# Patient Record
Sex: FEMALE | Race: WHITE | NOT HISPANIC OR LATINO
[De-identification: names, ages, dates, MRNs, and addresses within clinical notes are randomized per-mention and may not be internally consistent; named-entity substitution may affect disease eponyms.]

---

## 2023-12-01 PROBLEM — Z00.00 ENCOUNTER FOR PREVENTIVE HEALTH EXAMINATION: Status: ACTIVE | Noted: 2023-12-01

## 2023-12-14 ENCOUNTER — APPOINTMENT (OUTPATIENT)
Dept: SURGERY | Facility: CLINIC | Age: 73
End: 2023-12-14
Payer: MEDICARE

## 2023-12-14 VITALS
WEIGHT: 199 LBS | HEIGHT: 64 IN | DIASTOLIC BLOOD PRESSURE: 80 MMHG | BODY MASS INDEX: 33.97 KG/M2 | SYSTOLIC BLOOD PRESSURE: 130 MMHG

## 2023-12-14 DIAGNOSIS — Z80.41 FAMILY HISTORY OF MALIGNANT NEOPLASM OF OVARY: ICD-10-CM

## 2023-12-14 PROCEDURE — 99205 OFFICE O/P NEW HI 60 MIN: CPT

## 2023-12-14 NOTE — HISTORY OF PRESENT ILLNESS
[FreeTextEntry1] : Oncology History:  1. Left breast 2:00 10cm FN cT1cN0 IDC, grade 2, ER/MS +, Her 2 negative       -1.3cm on MMG, 0.9cm on US 0.9, 1.9 x 1.5 x 1cm on MRI   Care Team:  PCP-[facility]   HPI: Patient is a 73 year old female presenting for initial consultation on 23 for newly diagnosed left breast cancer. Patient underwent screening mammogram on 23 that demonstrated an irregular density 1. 3 cm in the left breast. Patient called back for ultrasound breast left on 23 that demonstrated an irregular mass in the left 2:00 10 cm FN measuring 0.9 cm in greatest diameter. Ultrasound guided biopsy preformed on 23 demonstrated invasive ductal carcinoma, moderately differentiated and focal ductal carcinoma in situ, solid type, intermediate nuclear grade, 7mm, ER/MS +, Her 2 negative.   BREAST HISTORY: No family history of breast cancer. Sister diagnosed with ovarian cancer in . Prior benign right breast biopsy in . Up to date on her mammograms. Denies breast complaints.   NKDA, no latex allergy No AC or aspirin     Imagin23: Zaid: Screening Mammogram Bilateral: Density B, Impression - In the left breast 3:00 position 11 cm FN there is an irregular indistinct density measuring 1. 3 cm for which ultrasound evaluation is needed. BI-RADS 0  23: Zaid: Ultrasound Breast Limited Left: Impression - Irregular mass in the left breast 2:00 position 10 cm FN measuring 0.9 cm in greatest diameter suspicious for neoplasm for which an ultrasound guided core biopsy is recommended. BI-RADS 4   23: Zaid: Ultrasound Guided Left Breast Biopsy: Coil clip, Impression - Invasive ductal carcinoma, moderately differentiated and focal ductal carcinoma in situ, solid type, intermediate nuclear grade, size at least 7 mm, ER/MS +, Her 2 negative, CONCORDANT   23: Zaid: MRI Breast: Q clip associated susceptibility artifact is present in the outer central left breast denoting the site of biopsy-proven malignancy (7-38). Of note the Q clip is along the lateral aspect of the irregular shaped mass with irregular margins measuring 1.9 x 1.5 x 1.0 cm (101/40). Evaluation of enhancement kinetics demonstrate areas of rapid initial and delayed washout enhancement kinetics. Abnormal enhancement extends up to 2.1 cm from the lateral skin surface. Impression - Biopsy proven malignancy in the outer central left breast measuring up to 1.9 cm. Of note the Q clip is along the lateral aspect of the mass. Right breast no evidence of malignancy

## 2023-12-14 NOTE — ASSESSMENT
[FreeTextEntry1] : 73 year old female with a left breast cT1cN0 IDC, grade 2, ER/MI +, Her 2 negative.   We discussed the patient's new diagnosis of IDC and the pathophysiology of the disease process. We reviewed her work-up, imaging and pathology results to date including her hormone receptor status. We discussed the difference between systemic and local treatment and options for each type of control. We discussed the role of a breast surgeon, medical oncologist, and radiation oncologist in the patients' treatment plan. We discussed the indications of surgery, anti-estrogen therapy, chemotherapy, radiation therapy, and the role of genetic testing. Given her sisters history of ovarian cancer, she does meet criteria for testing and this was drawn in the office today.   We reviewed the surgical options of mastectomy versus breast conservation therapy (BCT).  We discussed the equivalent survival outcomes for patients treated with lumpectomy/radiation or mastectomy. We discussed reconstruction in the mastectomy setting including implant-based, autologous, or flat closure, as well as in the lumpectomy setting with reduction/mastopexy. She would be a BCT candidate and she would prefer this option.     We also discussed the rational behind the axillary staging and sentinel lymph node biopsy. Standard of care would include this procedure at the time of her surgery, however given her age and lower risk of axillary involvement she is a candidate for omission of sentinel node biopsy. In her case this would only provide diagnostic and prognostic information, but would be unlikely change her treatment recommendations even if a small volume of axillary disease was found. Additionally, we discussed the morbidity involved with axillary surgery, as well as risk of lymphedema.   We discussed the same day nature of the procedure, post-operative expectations/healing, need for localization prior to surgery, and PST/medical clearance. I will go over the final pathology results with her at her post-op visit 7-14 days later.  Risks of the procedure were discussed including bleeding, infection, seroma, need for re-excision, scarring, pain, asymmetry, or cosmetic deformity.  She is meeting with Dr. Martel from medical oncology tomorrow. She is not interested in plastics consultation at this time.   Patient verbalized understanding for all treatment plans discussed. All of her questions were answered to the best of my ability. She was encouraged to call the office if any questions or concerns or come in sooner if needed.   Plan: 1. Left elder PM  2. PST/med clearance 3. Followup post-op 4. Genetics 5. Med onc consult

## 2023-12-14 NOTE — PAST MEDICAL HISTORY
[Postmenopausal] : The patient is postmenopausal [Menarche Age ____] : age at menarche was [unfilled] [Menopause Age____] : age at menopause was [unfilled] [Total Preg ___] : G[unfilled] [Age At Live Birth ___] : Age at live birth: [unfilled]

## 2023-12-14 NOTE — PHYSICAL EXAM
[Symmetrical] : symmetrical [Bra Size: ___] : Bra Size: [unfilled] [Grade 2] : Ptosis Grade 2 [No dominant masses] : no dominant masses in right breast  [No dominant masses] : no dominant masses left breast [No Nipple Retraction] : no left nipple retraction [No Nipple Discharge] : no left nipple discharge

## 2024-01-05 ENCOUNTER — RESULT REVIEW (OUTPATIENT)
Age: 74
End: 2024-01-05

## 2024-01-05 ENCOUNTER — APPOINTMENT (OUTPATIENT)
Dept: SURGERY | Facility: AMBULATORY MEDICAL SERVICES | Age: 74
End: 2024-01-05
Payer: MEDICARE

## 2024-01-05 PROCEDURE — 19301 PARTIAL MASTECTOMY: CPT | Mod: LT

## 2024-01-17 ENCOUNTER — APPOINTMENT (OUTPATIENT)
Dept: SURGERY | Facility: CLINIC | Age: 74
End: 2024-01-17
Payer: MEDICARE

## 2024-01-17 VITALS
SYSTOLIC BLOOD PRESSURE: 118 MMHG | DIASTOLIC BLOOD PRESSURE: 80 MMHG | BODY MASS INDEX: 34.15 KG/M2 | HEIGHT: 64 IN | WEIGHT: 200 LBS

## 2024-01-17 PROCEDURE — 99024 POSTOP FOLLOW-UP VISIT: CPT

## 2024-01-17 NOTE — PHYSICAL EXAM
[Normocephalic] : normocephalic [Atraumatic] : atraumatic [EOMI] : extra ocular movement intact [PERRL] : pupils equal, round and reactive to light [Sclera nonicteric] : sclera nonicteric [Conjunctiva pink] : conjunctiva pink [Supple] : supple [No Supraclavicular Adenopathy] : no supraclavicular adenopathy [No Cervical Adenopathy] : no cervical adenopathy [Examined in the supine and seated position] : examined in the supine and seated position [Symmetrical] : symmetrical [Bra Size: ___] : Bra Size: [unfilled] [Grade 2] : Ptosis Grade 2 [No dominant masses] : no dominant masses in right breast  [No dominant masses] : no dominant masses left breast [No Nipple Retraction] : no left nipple retraction [No Nipple Discharge] : no left nipple discharge [No Axillary Lymphadenopathy] : no left axillary lymphadenopathy [No Edema] : no edema [de-identified] : enrique areolar incision well approximated. No erythema or drainage

## 2024-01-17 NOTE — ASSESSMENT
[FreeTextEntry1] : 73 year old female with a left breast cT1cN0 IDC, grade 2, ER/MD +, Her 2 negative. Patient underwent left partial mastectomy FINAL PATH: Invasive ductal carcinoma 8mm, moderately differentiated and intermediate grade DCIS, DCIS is at least 12 mm, margins negative intraductal papilloma; pT1bNx.   We reviewed post-op expectations and recovery. I explained to her that she is healing well without concern. We reviewed her pathology in detail and I explained to her that we are complete from a surgical standpoint and she can proceed with radiation and systemic therapy. We will obtain her next baseline mammogram 6 months after completion of radiation. I will see her back in 6 months for her next CBE.   Patient verbalized understanding of all treatment plans. All of her questions were answered to the best of my ability. She was encouraged to call the office sooner for any questions or concerns.    Plan: 1. Followup rad onc 2. Followup med onc 3. Followup in 6 months

## 2024-01-17 NOTE — HISTORY OF PRESENT ILLNESS
[FreeTextEntry1] : Oncology History:  1. Left breast 2:00 10cm FN cT1cN0 --> pT1bNx IDC, grade 2, ER/UT +, Her 2 negative w/DCIS       -1.3cm on MMG, 0.9cm on US 0.9, 1.9 x 1.5 x 1cm on MRI       -s/p left elder partial mastectomy 23         FINAL PATH: Invasive ductal carcinoma 8mm, moderately differentiated and intermediate grade DCIS, DCIS is at least 12 mm, margins negative intraductal papilloma       -Genetic testing negative for pathogenic mutation       -Oncotype pending   Care Team:  PCP-[facility]   Interval History:  (23): Patient presents for post op visit. Denies any breast complaints. Saw Dr. Martel on Monday. Has an appt with Dr. Hallman next week.    HPI: Patient is a 73 year old female presenting for initial consultation on 23 for newly diagnosed left breast cancer. Patient underwent screening mammogram on 23 that demonstrated an irregular density 1. 3 cm in the left breast. Patient called back for ultrasound breast left on 23 that demonstrated an irregular mass in the left 2:00 10 cm FN measuring 0.9 cm in greatest diameter. Ultrasound guided biopsy preformed on 23 demonstrated invasive ductal carcinoma, moderately differentiated and focal ductal carcinoma in situ, solid type, intermediate nuclear grade, 7mm, ER/UT +, Her 2 negative.   BREAST HISTORY: No family history of breast cancer. Sister diagnosed with ovarian cancer in . Prior benign right breast biopsy in . Up to date on her mammograms. Denies breast complaints.   NKDA, no latex allergy No AC or aspirin  Imagin23: Zaid: Screening Mammogram Bilateral: Density B, Impression - In the left breast 3:00 position 11 cm FN there is an irregular indistinct density measuring 1. 3 cm for which ultrasound evaluation is needed. BI-RADS 0  23: Zaid: Ultrasound Breast Limited Left: Impression - Irregular mass in the left breast 2:00 position 10 cm FN measuring 0.9 cm in greatest diameter suspicious for neoplasm for which an ultrasound guided core biopsy is recommended. BI-RADS 4   23: Zaid: Ultrasound Guided Left Breast Biopsy: Coil clip, Impression - Invasive ductal carcinoma, moderately differentiated and focal ductal carcinoma in situ, solid type, intermediate nuclear grade, size at least 7 mm, ER/UT +, Her 2 negative, CONCORDANT   23: Zaid: MRI Breast: Q clip associated susceptibility artifact is present in the outer central left breast denoting the site of biopsy-proven malignancy (7-38). Of note the Q clip is along the lateral aspect of the irregularshaped mass with irregular margins measuring 1.9 x 1.5 x 1.0 cm (101/40). Evaluation of enhancement kinetics demonstrate areas of rapid initial and delayed washout enhancement kinetics. Abnormal enhancement extends up to 2.1cm from the lateral skin surface. Impression - Biopsy proven malignancy in the outer central left breast measuring up to 1.9 cm. Of note the Q clip is along the lateral aspect of the mass. Right breast no evidence of malignancy

## 2024-02-07 ENCOUNTER — LABORATORY RESULT (OUTPATIENT)
Age: 74
End: 2024-02-07

## 2024-02-07 ENCOUNTER — APPOINTMENT (OUTPATIENT)
Dept: SURGERY | Facility: CLINIC | Age: 74
End: 2024-02-07
Payer: MEDICARE

## 2024-02-07 VITALS
DIASTOLIC BLOOD PRESSURE: 82 MMHG | HEIGHT: 64 IN | WEIGHT: 195 LBS | BODY MASS INDEX: 33.29 KG/M2 | SYSTOLIC BLOOD PRESSURE: 124 MMHG

## 2024-02-07 PROCEDURE — 10140 I&D HMTMA SEROMA/FLUID COLLJ: CPT

## 2024-02-07 PROCEDURE — 99214 OFFICE O/P EST MOD 30 MIN: CPT | Mod: 25

## 2024-02-07 RX ORDER — CEPHALEXIN 500 MG/1
500 CAPSULE ORAL 3 TIMES DAILY
Qty: 21 | Refills: 0 | Status: ACTIVE | COMMUNITY
Start: 2024-02-07 | End: 1900-01-01

## 2024-02-07 NOTE — ASSESSMENT
[FreeTextEntry1] : 73 year old female with a left breast CT1cN0 IDC grade 2, ER/CO +, Her 2 negative. Underwent left partial mastectomy. Final path: Invasive ductal carcinoma 8 mm, moderately differentiated and intermediate grade DCIS, DCIS is at least 12 mm, margins negative intraductal papilloma, pT1bNx   Patient with swelling/pain to left breast for the past week. US guidance needle aspiration left breast preformed in office, drainage of seroma. Will cover with antibiotics for infection  Follow up in one week for check  Patient verbalized understanding of all treatment plans. All of her questions were answered to the best of my ability. She was encouraged to call the office sooner for any questions or concerns.   Plan 1. US guidance needle aspiration left breast  2. Antibiotics  3. Follow up in one week

## 2024-02-07 NOTE — PHYSICAL EXAM
[Normocephalic] : normocephalic [Atraumatic] : atraumatic [EOMI] : extra ocular movement intact [PERRL] : pupils equal, round and reactive to light [Sclera nonicteric] : sclera nonicteric [Conjunctiva pink] : conjunctiva pink [Supple] : supple [No Supraclavicular Adenopathy] : no supraclavicular adenopathy [No Cervical Adenopathy] : no cervical adenopathy [Examined in the supine and seated position] : examined in the supine and seated position [Symmetrical] : symmetrical [Bra Size: ___] : Bra Size: [unfilled] [Grade 2] : Ptosis Grade 2 [No dominant masses] : no dominant masses in right breast  [No dominant masses] : no dominant masses left breast [No Nipple Retraction] : no left nipple retraction [No Nipple Discharge] : no left nipple discharge [No Axillary Lymphadenopathy] : no left axillary lymphadenopathy [No Edema] : no edema [de-identified] : enrique areolar incision well approximated. swelling to the left breast resolving ecchymosis

## 2024-02-07 NOTE — PROCEDURE
[FreeTextEntry1] : Needle aspiration of left breast [FreeTextEntry2] : seroma of left breast [FreeTextEntry3] : skin was cleansed with chloraprep. lidocaine utilized for local anesthesia using 22 gauge needle. 18 gauge needle used for aspiration 100 cc of serous fluid, US guidance. End of procedure showed resolution of seroma

## 2024-02-07 NOTE — HISTORY OF PRESENT ILLNESS
[FreeTextEntry1] : Oncology History:  1. Left breast 2:00 10cm FN cT1cN0 --> pT1bNx IDC, grade 2, ER/UT +, Her 2 negative w/DCIS       -1.3cm on MMG, 0.9cm on US 0.9, 1.9 x 1.5 x 1cm on MRI       -s/p left elder partial mastectomy 23         FINAL PATH: Invasive ductal carcinoma 8mm, moderately differentiated and intermediate grade DCIS, DCIS is at least 12 mm, margins negative intraductal papilloma       -Genetic testing negative for pathogenic mutation       -Oncotype pending   Care Team:  PCP-[facility]   Interval History:  (23): Patient presents for post op visit. Denies any breast complaints. Saw Dr. Martel on Monday. Has an appt with Dr. Hallman next week.   (24): Patient presents for post op visit, complaining of swelling to the left breast and pain x one week feels it has been worsening. Denies fevers.    HPI: Patient is a 73 year old female presenting for initial consultation on 23 for newly diagnosed left breast cancer. Patient underwent screening mammogram on 23 that demonstrated an irregular density 1. 3 cm in the left breast. Patient called back for ultrasound breast left on 23 that demonstrated an irregular mass in the left 2:00 10 cm FN measuring 0.9 cm in greatest diameter. Ultrasound guided biopsy preformed on 23 demonstrated invasive ductal carcinoma, moderately differentiated and focal ductal carcinoma in situ, solid type, intermediate nuclear grade, 7mm, ER/UT +, Her 2 negative.   BREAST HISTORY: No family history of breast cancer. Sister diagnosed with ovarian cancer in . Prior benign right breast biopsy in . Up to date on her mammograms. Denies breast complaints.   NKDA, no latex allergy No AC or aspirin  Imagin23: Zaid: Screening Mammogram Bilateral: Density B, Impression - In the left breast 3:00 position 11 cm FN there is an irregular indistinct density measuring 1. 3 cm for which ultrasound evaluation is needed. BI-RADS 0  23: Zaid: Ultrasound Breast Limited Left: Impression - Irregular mass in the left breast 2:00 position 10 cm FN measuring 0.9 cm in greatest diameter suspicious for neoplasm for which an ultrasound guided core biopsy is recommended. BI-RADS 4   23: Zaid: Ultrasound Guided Left Breast Biopsy: Coil clip, Impression - Invasive ductal carcinoma, moderately differentiated and focal ductal carcinoma in situ, solid type, intermediate nuclear grade, size at least 7 mm, ER/UT +, Her 2 negative, CONCORDANT   23: Zaid: MRI Breast: Q clip associated susceptibility artifact is present in the outer central left breast denoting the site of biopsy-proven malignancy (7-38). Of note the Q clip is along the lateral aspect of the irregularshaped mass with irregular margins measuring 1.9 x 1.5 x 1.0 cm (101/40). Evaluation of enhancement kinetics demonstrate areas of rapid initial and delayed washout enhancement kinetics. Abnormal enhancement extends up to 2.1cm from the lateral skin surface. Impression - Biopsy proven malignancy in the outer central left breast measuring up to 1.9 cm. Of note the Q clip is along the lateral aspect of the mass. Right breast no evidence of malignancy

## 2024-02-09 ENCOUNTER — NON-APPOINTMENT (OUTPATIENT)
Age: 74
End: 2024-02-09

## 2024-02-14 ENCOUNTER — APPOINTMENT (OUTPATIENT)
Dept: SURGERY | Facility: CLINIC | Age: 74
End: 2024-02-14
Payer: MEDICARE

## 2024-02-14 VITALS
WEIGHT: 195 LBS | DIASTOLIC BLOOD PRESSURE: 72 MMHG | HEIGHT: 64 IN | BODY MASS INDEX: 33.29 KG/M2 | SYSTOLIC BLOOD PRESSURE: 122 MMHG

## 2024-02-14 PROCEDURE — 99214 OFFICE O/P EST MOD 30 MIN: CPT | Mod: 25

## 2024-02-14 PROCEDURE — 10140 I&D HMTMA SEROMA/FLUID COLLJ: CPT | Mod: LT,78

## 2024-02-14 RX ORDER — FLUCONAZOLE 150 MG/1
150 TABLET ORAL
Qty: 1 | Refills: 0 | Status: ACTIVE | COMMUNITY
Start: 2024-02-14 | End: 1900-01-01

## 2024-02-14 RX ORDER — AMOXICILLIN AND CLAVULANATE POTASSIUM 875; 125 MG/1; MG/1
875-125 TABLET, COATED ORAL
Qty: 20 | Refills: 0 | Status: ACTIVE | COMMUNITY
Start: 2024-02-14 | End: 1900-01-01

## 2024-02-14 NOTE — ASSESSMENT
[FreeTextEntry1] : 73 year old female with a left breast cT1cN0 IDC grade 2, ER/TN +, Her 2 negative. Underwent left partial mastectomy final path: Invasive ductal carcinoma 8 mm, moderately differentiated and intermediate grade DCIS, DCIS is at least 12 mm, margins negative intraductal papilloma, pT1bNx.   Patient states swelling to left breast returned in the past few days along with redness. Culture reviewed with patient will switch antibiotics to augmentin, pt states she finished keflex dose. US guidance needle aspiration of seroma in the office preformed.   Follow up in one week for wound check, come in sooner if needed  Patient verbalized understanding of all treatment plans. All of her questions were answered to the best of my ability. She was encouraged to call the office sooner for any questions or concerns.   Plan 1. US guidance needle aspiration of left breast 2. Start on augmentin antibiotics 3. Follow up in one week

## 2024-02-14 NOTE — PROCEDURE
[FreeTextEntry1] : Drainage of seroma- needle aspiration [FreeTextEntry3] : Chloraprep used to cleanse the area, lidocaine used for general anesthesia with a 22 gauge needle, needle aspiration with a 18 gauge needle used 80 cc of serous fluid, US used during procedure, showed resolution of seroma. Injected patient with 10cc of betadine with normal saline. Dressing placed over area  [FreeTextEntry2] : seroma

## 2024-02-14 NOTE — PHYSICAL EXAM
[Normocephalic] : normocephalic [Atraumatic] : atraumatic [EOMI] : extra ocular movement intact [PERRL] : pupils equal, round and reactive to light [Sclera nonicteric] : sclera nonicteric [Conjunctiva pink] : conjunctiva pink [Supple] : supple [No Supraclavicular Adenopathy] : no supraclavicular adenopathy [No Cervical Adenopathy] : no cervical adenopathy [Examined in the supine and seated position] : examined in the supine and seated position [Symmetrical] : symmetrical [Bra Size: ___] : Bra Size: [unfilled] [Grade 2] : Ptosis Grade 2 [No dominant masses] : no dominant masses in right breast  [No dominant masses] : no dominant masses left breast [No Nipple Retraction] : no left nipple retraction [No Nipple Discharge] : no left nipple discharge [No Axillary Lymphadenopathy] : no left axillary lymphadenopathy [No Edema] : no edema [de-identified] : enrique areolar incision well approximated. swelling to the left breast redness noted to area

## 2024-02-21 ENCOUNTER — APPOINTMENT (OUTPATIENT)
Dept: SURGERY | Facility: CLINIC | Age: 74
End: 2024-02-21
Payer: MEDICARE

## 2024-02-21 VITALS
DIASTOLIC BLOOD PRESSURE: 80 MMHG | BODY MASS INDEX: 33.29 KG/M2 | WEIGHT: 195 LBS | HEIGHT: 64 IN | SYSTOLIC BLOOD PRESSURE: 118 MMHG

## 2024-02-21 PROCEDURE — 99214 OFFICE O/P EST MOD 30 MIN: CPT

## 2024-02-21 NOTE — PHYSICAL EXAM
[Normocephalic] : normocephalic [Atraumatic] : atraumatic [EOMI] : extra ocular movement intact [PERRL] : pupils equal, round and reactive to light [Sclera nonicteric] : sclera nonicteric [Conjunctiva pink] : conjunctiva pink [Supple] : supple [No Supraclavicular Adenopathy] : no supraclavicular adenopathy [No Cervical Adenopathy] : no cervical adenopathy [Examined in the supine and seated position] : examined in the supine and seated position [Symmetrical] : symmetrical [Bra Size: ___] : Bra Size: [unfilled] [Grade 2] : Ptosis Grade 2 [No dominant masses] : no dominant masses in right breast  [No dominant masses] : no dominant masses left breast [No Nipple Retraction] : no left nipple retraction [No Nipple Discharge] : no left nipple discharge [No Axillary Lymphadenopathy] : no left axillary lymphadenopathy [No Edema] : no edema [de-identified] : enrique areolar incision well approximated. mild swelling noted to UOQ left breast, mild erythema, area of skin scabbed to mid breast

## 2024-02-21 NOTE — PROCEDURE
[FreeTextEntry1] : Needle aspiration left breast seroma [FreeTextEntry2] : Left breast seroma [FreeTextEntry3] : Chlora prep used to cleanse area, lidocaine used for general anesthesia with a 22 gauge needle, needle aspiration with a 18 gauge needle 40 cc of serous fluid. US used during procedure showed resolution of seroma. Injected patient with 10 cc of betadine with normal saline in area after procedure. Dressing placed over area.

## 2024-02-21 NOTE — ASSESSMENT
[FreeTextEntry1] : 73 year old female with a left breast cT1cN0 IDC, grade 2, ER/NH +, Her 2 negative. Underwent left partial mastectomy final path: Invasive ductal carcinoma 8 mm, moderately differentiated and intermediate grade DCIS, DCIS is at least 12 mm, margins negative, intraductal papilloma, pT1bNx.   Symptoms improved however still mild swelling and redness tot he breast. In UOQ of breast fluid noted on US, needle aspiration in the office preformed. Continue antibiotics   Follow up in one week for wound check/come in sooner if needed.   Patient verbalized understanding of all treatment plans. All of her questions were answered to the best of my ability. She was encouraged to call the office sooner for any questions or concerns.   Plan 1. US Guidance needle aspiration left breast  2. Continue augmentin 3. Follow up in one week

## 2024-02-21 NOTE — HISTORY OF PRESENT ILLNESS
[FreeTextEntry1] : Oncology History:  1. Left breast 2:00 10cm FN cT1cN0 --> pT1bNx IDC, grade 2, ER/MS +, Her 2 negative w/DCIS       -1.3cm on MMG, 0.9cm on US 0.9, 1.9 x 1.5 x 1cm on MRI       -s/p left elder partial mastectomy 23         FINAL PATH: Invasive ductal carcinoma 8mm, moderately differentiated and intermediate grade DCIS, DCIS is at least 12 mm, margins negative intraductal papilloma       -Genetic testing negative for pathogenic mutation       -Oncotype pending   Care Team:  PCP-[facility]isha Luong [facility]   Interval History:  (23): Patient presents for post op visit. Denies any breast complaints. Saw Dr. Martel on Monday. Has an appt with Dr. Hallman next week.   (24): Patient presents for post op visit, complaining of swelling to the left breast and pain x one week feels it has been worsening. Denies fevers.  (24): Patient presents for follow up. States swelling to left breast returned and redness now for the past few days. Was feeling better and had improvement in symptoms for the first few days. Denies fevers at home. Finished keflex   (24): Patient presents for follow up. Improvement in symptoms however breast with mild swelling and redness. No fevers at home. On day 6 of augmentin antibiotics    HPI: Patient is a 73 year old female presenting for initial consultation on 23 for newly diagnosed left breast cancer. Patient underwent screening mammogram on 23 that demonstrated an irregular density 1. 3 cm in the left breast. Patient called back for ultrasound breast left on 23 that demonstrated an irregular mass in the left 2:00 10 cm FN measuring 0.9 cm in greatest diameter. Ultrasound guided biopsy preformed on 23 demonstrated invasive ductal carcinoma, moderately differentiated and focal ductal carcinoma in situ, solid type, intermediate nuclear grade, 7mm, ER/MS +, Her 2 negative.   BREAST HISTORY: No family history of breast cancer. Sister diagnosed with ovarian cancer in . Prior benign right breast biopsy in . Up to date on her mammograms. Denies breast complaints.   NKDA, no latex allergy No AC or aspirin  Imagin23: Zaid: Screening Mammogram Bilateral: Density B, Impression - In the left breast 3:00 position 11 cm FN there is an irregular indistinct density measuring 1. 3 cm for which ultrasound evaluation is needed. BI-RADS 0  23: Zaid: Ultrasound Breast Limited Left: Impression - Irregular mass in the left breast 2:00 position 10 cm FN measuring 0.9 cm in greatest diameter suspicious for neoplasm for which an ultrasound guided core biopsy is recommended. BI-RADS 4   23: Zaid: Ultrasound Guided Left Breast Biopsy: Coil clip, Impression - Invasive ductal carcinoma, moderately differentiated and focal ductal carcinoma in situ, solid type, intermediate nuclear grade, size at least 7 mm, ER/MS +, Her 2 negative, CONCORDANT   23: Zaid: MRI Breast: Q clip associated susceptibility artifact is present in the outer central left breast denoting the site of biopsy-proven malignancy (7-38). Of note the Q clip is along the lateral aspect of the irregularshaped mass with irregular margins measuring 1.9 x 1.5 x 1.0 cm (101/40). Evaluation of enhancement kinetics demonstrate areas of rapid initial and delayed washout enhancement kinetics. Abnormal enhancement extends up to 2.1cm from the lateral skin surface. Impression - Biopsy proven malignancy in the outer central left breast measuring up to 1.9 cm. Of note the Q clip is along the lateral aspect of the mass. Right breast no evidence of malignancy

## 2024-02-26 NOTE — PAST MEDICAL HISTORY
[Menarche Age ____] : age at menarche was [unfilled] [Postmenopausal] : The patient is postmenopausal [Menopause Age____] : age at menopause was [unfilled] [Age At Live Birth ___] : Age at live birth: [unfilled] [Total Preg ___] : G[unfilled]

## 2024-02-27 ENCOUNTER — APPOINTMENT (OUTPATIENT)
Dept: SURGERY | Facility: CLINIC | Age: 74
End: 2024-02-27
Payer: MEDICARE

## 2024-02-27 VITALS
HEIGHT: 64 IN | BODY MASS INDEX: 33.29 KG/M2 | DIASTOLIC BLOOD PRESSURE: 90 MMHG | SYSTOLIC BLOOD PRESSURE: 120 MMHG | WEIGHT: 195 LBS

## 2024-02-27 PROCEDURE — 99212 OFFICE O/P EST SF 10 MIN: CPT

## 2024-02-27 NOTE — ASSESSMENT
[FreeTextEntry1] : 73 year old female with a left breast cT1cN0 IDC, grade 2, ER/[place] +, Her 2 negative. Underwent left partial mastectomy final path: Invasive ductal carcinoma 8 mm, moderately differentiated and intermediate grade DCIS, DCIS is at least 12 mm, margins negative, intraductal papilloma, pT1bNx.   Symptoms improved swelling and redness to the breast. No needle aspiration indicated at this time. Patient completed antibiotics.   Patient to follow up with radiation oncology. Will see patient back in two weeks for wound check.   Patient verbalized understanding of all treatment plans. All of her questions were answered to the best of my ability. She was encouraged to call the office sooner for any questions or concerns.   Plan 1. Follow up with radiation oncology 2. Follow up in 2 weeks

## 2024-02-27 NOTE — PHYSICAL EXAM
[Atraumatic] : atraumatic [Normocephalic] : normocephalic [EOMI] : extra ocular movement intact [PERRL] : pupils equal, round and reactive to light [Sclera nonicteric] : sclera nonicteric [Supple] : supple [Conjunctiva pink] : conjunctiva pink [No Supraclavicular Adenopathy] : no supraclavicular adenopathy [No Cervical Adenopathy] : no cervical adenopathy [Symmetrical] : symmetrical [Examined in the supine and seated position] : examined in the supine and seated position [Bra Size: ___] : Bra Size: [unfilled] [Grade 2] : Ptosis Grade 2 [No dominant masses] : no dominant masses in right breast  [No dominant masses] : no dominant masses left breast [No Nipple Retraction] : no left nipple retraction [No Nipple Discharge] : no left nipple discharge [No Axillary Lymphadenopathy] : no left axillary lymphadenopathy [No Edema] : no edema [de-identified] : enrique areolar incision well approximated. Improvement of swelling noted to UOQ left breast, mild erythema, area of skin scabbed to mid breast, ecchymosis noted to UOQ

## 2024-02-27 NOTE — HISTORY OF PRESENT ILLNESS
[FreeTextEntry1] : Oncology History:  1. Left breast 2:00 10cm FN cT1cN0 --> pT1bNx IDC, grade 2, ER/[place] +, Her 2 negative w/DCIS       -1.3cm on MMG, 0.9cm on US 0.9, 1.9 x 1.5 x 1cm on MRI       -s/p left elder partial mastectomy 23         FINAL PATH: Invasive ductal carcinoma 8mm, moderately differentiated and intermediate grade DCIS, DCIS is at least 12 mm, margins negative intraductal papilloma       -Genetic testing negative for pathogenic mutation       -Oncotype pending   Care Team:  PCP-[facility] Lu[facility]   Interval History:  (23): Patient presents for post op visit. Denies any breast complaints. Saw Dr. Martel on Monday. Has an appt with Dr. Hallman next week.   (24): Patient presents for post op visit, complaining of swelling to the left breast and pain x one week feels it has been worsening. Denies fevers.  (24): Patient presents for follow up. States swelling to left breast returned and redness now for the past few days. Was feeling better and had improvement in symptoms for the first few days. Denies fevers at home. Finished keflex   (24): Patient presents for follow up. Improvement in symptoms however breast with mild swelling and redness. No fevers at home. On day 6 of augmentin antibiotics   (24): Patient presents for follow up. Improvement in symptoms - swelling and redness. Completed course of antibiotics    HPI: Patient is a 73 year old female presenting for initial consultation on 23 for newly diagnosed left breast cancer. Patient underwent screening mammogram on 23 that demonstrated an irregular density 1. 3 cm in the left breast. Patient called back for ultrasound breast left on 23 that demonstrated an irregular mass in the left 2:00 10 cm FN measuring 0.9 cm in greatest diameter. Ultrasound guided biopsy preformed on 23 demonstrated invasive ductal carcinoma, moderately differentiated and focal ductal carcinoma in situ, solid type, intermediate nuclear grade, 7mm, ER/[place] +, Her 2 negative.   BREAST HISTORY: No family history of breast cancer. Sister diagnosed with ovarian cancer in . Prior benign right breast biopsy in . Up to date on her mammograms. Denies breast complaints.   NKDA, no latex allergy No AC or aspirin  Imagin23: Zaid: Screening Mammogram Bilateral: Density B, Impression - In the left breast 3:00 position 11 cm FN there is an irregular indistinct density measuring 1. 3 cm for which ultrasound evaluation is needed. BI-RADS 0  23: Zaid: Ultrasound Breast Limited Left: Impression - Irregular mass in the left breast 2:00 position 10 cm FN measuring 0.9 cm in greatest diameter suspicious for neoplasm for which an ultrasound guided core biopsy is recommended. BI-RADS 4   23: Zaid: Ultrasound Guided Left Breast Biopsy: Coil clip, Impression - Invasive ductal carcinoma, moderately differentiated and focal ductal carcinoma in situ, solid type, intermediate nuclear grade, size at least 7 mm, ER/KS +, Her 2 negative, CONCORDANT   23: Zaid: MRI Breast: Q clip associated susceptibility artifact is present in the outer central left breast denoting the site of biopsy-proven malignancy (7-38). Of note the Q clip is along the lateral aspect of the irregularshaped mass with irregular margins measuring 1.9 x 1.5 x 1.0 cm (101/40). Evaluation of enhancement kinetics demonstrate areas of rapid initial and delayed washout enhancement kinetics. Abnormal enhancement extends up to 2.1cm from the lateral skin surface. Impression - Biopsy proven malignancy in the outer central left breast measuring up to 1.9 cm. Of note the Q clip is along the lateral aspect of the mass. Right breast no evidence of malignancy

## 2024-03-12 ENCOUNTER — APPOINTMENT (OUTPATIENT)
Dept: SURGERY | Facility: CLINIC | Age: 74
End: 2024-03-12
Payer: MEDICARE

## 2024-03-12 VITALS
BODY MASS INDEX: 33.29 KG/M2 | SYSTOLIC BLOOD PRESSURE: 122 MMHG | WEIGHT: 195 LBS | DIASTOLIC BLOOD PRESSURE: 80 MMHG | HEIGHT: 64 IN

## 2024-03-12 DIAGNOSIS — N64.89 OTHER SPECIFIED DISORDERS OF BREAST: ICD-10-CM

## 2024-03-12 DIAGNOSIS — Z17.0 MALIGNANT NEOPLASM OF UPPER-OUTER QUADRANT OF LEFT FEMALE BREAST: ICD-10-CM

## 2024-03-12 DIAGNOSIS — C50.412 MALIGNANT NEOPLASM OF UPPER-OUTER QUADRANT OF LEFT FEMALE BREAST: ICD-10-CM

## 2024-03-12 PROCEDURE — 99024 POSTOP FOLLOW-UP VISIT: CPT

## 2024-03-12 NOTE — ASSESSMENT
[FreeTextEntry1] : 73 year old female with a left breast cT1cN0 IDC, grade 2, ER/[place] +, Her 2 negative. Underwent left partial mastectomy final path: Invasive ductal carcinoma 8 mm, moderately differentiated and intermediate grade DCIS, DCIS is at least 12 mm, margins negative, intraductal papilloma, pT1bNx.   Symptoms improved swelling and redness to the breast.   Patient to follow up in july for CBE.   Patient will be starting radiation march 25th, follow up with radiation oncology   Patient verbalized understanding of all treatment plans. All of her questions were answered to the best of my ability. She was encouraged to call the office sooner for any questions or concerns.   Plan 1. Follow up in july 2. Follow up with radiation oncology

## 2024-03-12 NOTE — HISTORY OF PRESENT ILLNESS
[FreeTextEntry1] : Oncology History:  1. 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 Denies any breast complaints. Saw Dr. Martel on Monday. Has an appt with Dr. Hallman next week.   (24): Patient presents for post op visit, complaining of swelling to the left breast and pain x one week feels it has been worsening. Denies fevers.  (24): Patient presents for follow up. States swelling to left breast returned and redness now for the past few days. Was feeling better and had improvement in symptoms for the first few days. Denies fevers at home. Finished keflex   (24): Patient presents for follow up. Improvement in symptoms however breast with mild swelling and redness. No fevers at home. On day 6 of Augmentin antibiotics   (24): Patient presents for follow up. Improvement in symptoms - swelling and redness. Completed course of antibiotics   (24): Patient presents for follow up. Improvement in all symptoms, denies pain to area. Radiation markers placed, will start radiation .    HPI: Patient is a 73 year old female presenting for initial consultation on 23 for newly diagnosed left breast cancer. Patient underwent screening mammogram on 23 that demonstrated an irregular density 1. 3 cm in the left breast. Patient called back for ultrasound breast left on 23 that demonstrated an irregular mass in the left 2:00 10 cm FN measuring 0.9 cm in greatest diameter. Ultrasound guided biopsy preformed on 23 demonstrated invasive ductal carcinoma, moderately differentiated and focal ductal carcinoma in situ, solid type, intermediate nuclear grade, 7mm, ER/[place] +, Her 2 negative.   BREAST HISTORY: No family history of breast cancer. Sister diagnosed with ovarian cancer in . Prior benign right breast biopsy in . Up to date on her mammograms. Denies breast complaints.   NKDA, no latex allergy No AC or aspirin  Imagin23: Zaid: Screening Mammogram Bilateral: Density B, Impression - In the left breast 3:00 position 11 cm FN there is an irregular indistinct density measuring 1. 3 cm for which ultrasound evaluation is needed. BI-RADS 0  23: Zaid: Ultrasound Breast Limited Left: Impression - Irregular mass in the left breast 2:00 position 10 cm FN measuring 0.9 cm in greatest diameter suspicious for neoplasm for which an ultrasound guided core biopsy is recommended. BI-RADS 4   23: Zaid: Ultrasound Guided Left Breast Biopsy: Coil clip, Impression - Invasive ductal carcinoma, moderately differentiated and focal ductal carcinoma in situ, solid type, intermediate nuclear grade, size at least 7 mm, ER/[place] +, Her 2 negative, CONCORDANT   23: Zaid: MRI Breast: Q clip associated susceptibility artifact is present in the outer central left breast denoting the site of biopsy-proven malignancy (7-38). Of note the Q clip is along the lateral aspect of the irregularshaped mass with irregular margins measuring 1.9 x 1.5 x 1.0 cm (101/40). Evaluation of enhancement kinetics demonstrate areas of rapid initial and delayed washout enhancement kinetics. Abnormal enhancement extends up to 2.1cm from the lateral skin surface. Impression - Biopsy proven malignancy in the outer central left breast measuring up to 1.9 cm. Of note the Q clip is along the lateral aspect of the mass. Right breast no evidence of malignancy

## 2024-03-12 NOTE — PHYSICAL EXAM
[Normocephalic] : normocephalic [Atraumatic] : atraumatic [EOMI] : extra ocular movement intact [Sclera nonicteric] : sclera nonicteric [PERRL] : pupils equal, round and reactive to light [Conjunctiva pink] : conjunctiva pink [Supple] : supple [No Cervical Adenopathy] : no cervical adenopathy [No Supraclavicular Adenopathy] : no supraclavicular adenopathy [Symmetrical] : symmetrical [Examined in the supine and seated position] : examined in the supine and seated position [Bra Size: ___] : Bra Size: [unfilled] [No dominant masses] : no dominant masses in right breast  [Grade 2] : Ptosis Grade 2 [No dominant masses] : no dominant masses left breast [No Nipple Retraction] : no left nipple retraction [No Nipple Discharge] : no left nipple discharge [No Axillary Lymphadenopathy] : no left axillary lymphadenopathy [No Edema] : no edema [de-identified] : enrique areolar incision well approximated. Improvement of swelling noted to UOQ left breast, mild erythema, area of skin scabbed to mid breast

## 2024-07-17 ENCOUNTER — APPOINTMENT (OUTPATIENT)
Dept: SURGERY | Facility: CLINIC | Age: 74
End: 2024-07-17
Payer: MEDICARE

## 2024-07-17 VITALS
BODY MASS INDEX: 33.29 KG/M2 | HEIGHT: 64 IN | DIASTOLIC BLOOD PRESSURE: 84 MMHG | SYSTOLIC BLOOD PRESSURE: 108 MMHG | WEIGHT: 195 LBS

## 2024-07-17 DIAGNOSIS — C50.412 MALIGNANT NEOPLASM OF UPPER-OUTER QUADRANT OF LEFT FEMALE BREAST: ICD-10-CM

## 2024-07-17 DIAGNOSIS — Z17.0 MALIGNANT NEOPLASM OF UPPER-OUTER QUADRANT OF LEFT FEMALE BREAST: ICD-10-CM

## 2024-07-17 PROCEDURE — 99213 OFFICE O/P EST LOW 20 MIN: CPT

## 2025-01-21 ENCOUNTER — APPOINTMENT (OUTPATIENT)
Facility: CLINIC | Age: 75
End: 2025-01-21
Payer: MEDICARE

## 2025-01-21 VITALS
WEIGHT: 185 LBS | DIASTOLIC BLOOD PRESSURE: 80 MMHG | SYSTOLIC BLOOD PRESSURE: 108 MMHG | RESPIRATION RATE: 16 BRPM | OXYGEN SATURATION: 99 % | HEIGHT: 64 IN | BODY MASS INDEX: 31.58 KG/M2 | HEART RATE: 88 BPM

## 2025-01-21 DIAGNOSIS — Z17.0 MALIGNANT NEOPLASM OF UPPER-OUTER QUADRANT OF LEFT FEMALE BREAST: ICD-10-CM

## 2025-01-21 DIAGNOSIS — C50.412 MALIGNANT NEOPLASM OF UPPER-OUTER QUADRANT OF LEFT FEMALE BREAST: ICD-10-CM

## 2025-01-21 PROCEDURE — 99213 OFFICE O/P EST LOW 20 MIN: CPT

## 2025-07-23 ENCOUNTER — APPOINTMENT (OUTPATIENT)
Facility: CLINIC | Age: 75
End: 2025-07-23

## 2025-07-28 ENCOUNTER — NON-APPOINTMENT (OUTPATIENT)
Age: 75
End: 2025-07-28

## 2025-07-28 ENCOUNTER — APPOINTMENT (OUTPATIENT)
Facility: CLINIC | Age: 75
End: 2025-07-28
Payer: MEDICARE

## 2025-07-28 VITALS
HEART RATE: 74 BPM | OXYGEN SATURATION: 99 % | BODY MASS INDEX: 32.44 KG/M2 | RESPIRATION RATE: 16 BRPM | WEIGHT: 190 LBS | DIASTOLIC BLOOD PRESSURE: 80 MMHG | SYSTOLIC BLOOD PRESSURE: 110 MMHG | HEIGHT: 64 IN

## 2025-07-28 DIAGNOSIS — Z17.0 MALIGNANT NEOPLASM OF UPPER-OUTER QUADRANT OF LEFT FEMALE BREAST: ICD-10-CM

## 2025-07-28 DIAGNOSIS — C50.412 MALIGNANT NEOPLASM OF UPPER-OUTER QUADRANT OF LEFT FEMALE BREAST: ICD-10-CM

## 2025-07-28 PROCEDURE — 99213 OFFICE O/P EST LOW 20 MIN: CPT
